# Patient Record
Sex: MALE | Race: WHITE
[De-identification: names, ages, dates, MRNs, and addresses within clinical notes are randomized per-mention and may not be internally consistent; named-entity substitution may affect disease eponyms.]

---

## 2019-09-25 ENCOUNTER — HOSPITAL ENCOUNTER (EMERGENCY)
Dept: HOSPITAL 61 - ER | Age: 84
Discharge: HOME | End: 2019-09-25
Payer: MEDICARE

## 2019-09-25 VITALS — BODY MASS INDEX: 27.92 KG/M2 | WEIGHT: 195 LBS | HEIGHT: 70 IN

## 2019-09-25 VITALS — SYSTOLIC BLOOD PRESSURE: 141 MMHG | DIASTOLIC BLOOD PRESSURE: 69 MMHG

## 2019-09-25 DIAGNOSIS — I48.91: ICD-10-CM

## 2019-09-25 DIAGNOSIS — I10: ICD-10-CM

## 2019-09-25 DIAGNOSIS — M10.9: Primary | ICD-10-CM

## 2019-09-25 DIAGNOSIS — E78.00: ICD-10-CM

## 2019-09-25 LAB
ANION GAP SERPL CALC-SCNC: 7 MMOL/L (ref 6–14)
BASOPHILS # BLD AUTO: 0 X10^3/UL (ref 0–0.2)
BASOPHILS NFR BLD: 0 % (ref 0–3)
BUN SERPL-MCNC: 27 MG/DL (ref 8–26)
CALCIUM SERPL-MCNC: 9 MG/DL (ref 8.5–10.1)
CHLORIDE SERPL-SCNC: 106 MMOL/L (ref 98–107)
CO2 SERPL-SCNC: 29 MMOL/L (ref 21–32)
CREAT SERPL-MCNC: 1.4 MG/DL (ref 0.7–1.3)
CRP SERPL-MCNC: < 0.5 MG/L (ref 0–3.3)
EOSINOPHIL NFR BLD: 0.1 X10^3/UL (ref 0–0.7)
EOSINOPHIL NFR BLD: 2 % (ref 0–3)
ERYTHROCYTE [DISTWIDTH] IN BLOOD BY AUTOMATED COUNT: 13.9 % (ref 11.5–14.5)
GFR SERPLBLD BASED ON 1.73 SQ M-ARVRAT: 48.4 ML/MIN
GLUCOSE SERPL-MCNC: 97 MG/DL (ref 70–99)
HCT VFR BLD CALC: 39.9 % (ref 39–53)
HGB BLD-MCNC: 13.8 G/DL (ref 13–17.5)
LYMPHOCYTES # BLD: 1.1 X10^3/UL (ref 1–4.8)
LYMPHOCYTES NFR BLD AUTO: 16 % (ref 24–48)
MCH RBC QN AUTO: 32 PG (ref 25–35)
MCHC RBC AUTO-ENTMCNC: 35 G/DL (ref 31–37)
MCV RBC AUTO: 91 FL (ref 79–100)
MONO #: 0.6 X10^3/UL (ref 0–1.1)
MONOCYTES NFR BLD: 9 % (ref 0–9)
NEUT #: 5.3 X10^3/UL (ref 1.8–7.7)
NEUTROPHILS NFR BLD AUTO: 74 % (ref 31–73)
PLATELET # BLD AUTO: 163 X10^3/UL (ref 140–400)
POTASSIUM SERPL-SCNC: 4.3 MMOL/L (ref 3.5–5.1)
RBC # BLD AUTO: 4.39 X10^6/UL (ref 4.3–5.7)
SODIUM SERPL-SCNC: 142 MMOL/L (ref 136–145)
WBC # BLD AUTO: 7.2 X10^3/UL (ref 4–11)

## 2019-09-25 PROCEDURE — 84550 ASSAY OF BLOOD/URIC ACID: CPT

## 2019-09-25 PROCEDURE — 85025 COMPLETE CBC W/AUTO DIFF WBC: CPT

## 2019-09-25 PROCEDURE — 85651 RBC SED RATE NONAUTOMATED: CPT

## 2019-09-25 PROCEDURE — 80048 BASIC METABOLIC PNL TOTAL CA: CPT

## 2019-09-25 PROCEDURE — 86140 C-REACTIVE PROTEIN: CPT

## 2019-09-25 PROCEDURE — 73630 X-RAY EXAM OF FOOT: CPT

## 2019-09-25 PROCEDURE — 36415 COLL VENOUS BLD VENIPUNCTURE: CPT

## 2019-09-25 NOTE — RAD
Examination: FOOT LEFT 3V

 

History: Pain

 

Comparison/Correlation: None

 

Findings: Total of 3 images of the left foot were obtained. Joint spaces 

are unremarkable.

 

 

Small calcaneal spurs present. Enthesopathy at the distal Achilles tendon 

attachment site noted.

 

There is a linear lucency involving the fifth digit proximal phalanx at 

its distal metaphyseal aspect extending nearly to the epiphysis. This is 

seen only on the AP view.

 

 

Impression:

Linear lucency involving the fifth digit proximal phalanx. Correlate with 

symptoms for possibly of a nondisplaced fracture.

 

First metatarsophalangeal joint is unremarkable for the patient's age.

 

Electronically signed by: Calin Sosa MD (9/25/2019 8:26 AM) [facility]

## 2019-09-25 NOTE — PHYS DOC
Past Medical History


Past Medical History:  A-Fib, High Cholesterol, Hypertension


Smoking:  Cigarettes (The patient is a nonsmoker.)





Adult General


Chief Complaint


Chief Complaint:  LOWER EXT PAIN





HPI


HPI


Patient is a pleasant 84-year-old female presents to the emergency department 

for evaluation. He states last night, he developed atraumatic left foot pain, 

located in his first MTP joint. He denies any fevers, or any injuries. 

Ambulation significantly worsens his pain, as does palpation. There are no 

alleviating factors to her symptoms. He has no previously known history of gout 

that he is aware of. He does have a past history of atrial fibrillation and 

takes Eliquis. He denies any chest pain, or shortness of breath, or any other 

concerning symptoms.





Review of Systems


Review of Systems





Constitutional: Denies fever or chills []


Eyes: Denies change in visual acuity, redness, or eye pain []


HENT: Denies nasal congestion or sore throat []


Respiratory: Denies cough or shortness of breath []


Cardiovascular: The patient denies any shortness of breath, chest pain, 

palpitations, or orthopnea[]


GI: Denies abdominal pain, nausea, vomiting, bloody stools or diarrhea []


: Denies dysuria or hematuria []


Musculoskeletal: Denies back pain or joint pain except as noted in the history 

of present illness []


Integument: Denies rash or skin lesions []


Neurologic: Denies headache, focal weakness or sensory changes []


Endocrine: Denies polyuria or polydipsia []





All other systems were reviewed and found to be within normal limits, except as 

documented in this note.





Current Medications


Current Medications





Current Medications








 Medications


  (Trade)  Dose


 Ordered  Sig/Burak  Start Time


 Stop Time Status Last Admin


Dose Admin


 


 Colchicine


  (Colcrys)  1.2 mg  1X  ONCE  9/25/19 08:45


 9/25/19 08:46 DC 9/25/19 08:59


1.2 MG


 


 Indomethacin


  (Indocin)  25 mg  1X  9/25/19 08:45


 9/25/19 09:56 DC 9/25/19 08:59


25 MG











Allergies


Allergies





Allergies








Coded Allergies Type Severity Reaction Last Updated Verified


 


  No Known Drug Allergies    9/25/19 No











Physical Exam


Physical Exam


PHYSICAL EXAM:





CONSTITUTIONAL: Well developed, well nourished


HEAD: normocephalic, atraumatic


EENT: PERRL, EOMI. Conjunctivae normal color, sclerae non-icteric; moist mucous 

membranes.


NECK: Supple, non-tender; no meningismus.


LUNGS: Lungs CTA, breathing even and unlabored. Normal air movement.


HEART: Regular rate and rhythm, no murmur


CHEST: No deformity; non-tender


ABDOMEN: The abdomen is soft, and non-tender, no masses or bruits.


EXTREM: Normal ROM; no deformity, no calf tenderness. Normal pulses palpable in 

all extremities. There is no pedal edema. There is mild erythema, and tenderness

 to palpation in the first MTP joint of the left foot, without significant 

warmth. The remainder of the foot is unremarkable and nontender, strong dorsalis

 pedis pulses present, the left ankle and remainder the left lower extremity are

 unremarkable.


SKIN: No rash; no diaphoresis


NEURO: Alert; normal speech and cognition; CN's grossly intact; strength grossly

 intact without focal deficit.


BACK: No CVA TTP.





Current Patient Data


Vital Signs





                                   Vital Signs








  Date Time  Temp Pulse Resp B/P (MAP) Pulse Ox O2 Delivery O2 Flow Rate FiO2


 


9/25/19 08:00 97.7 71 17 148/68 (94) 97 Room Air  





 97.7       








Lab Values





                                Laboratory Tests








Test


 9/25/19


08:35


 


White Blood Count


 7.2 x10^3/uL


(4.0-11.0)


 


Red Blood Count


 4.39 x10^6/uL


(4.30-5.70)


 


Hemoglobin


 13.8 g/dL


(13.0-17.5)


 


Hematocrit


 39.9 %


(39.0-53.0)


 


Mean Corpuscular Volume


 91 fL ()





 


Mean Corpuscular Hemoglobin 32 pg (25-35)  


 


Mean Corpuscular Hemoglobin


Concent 35 g/dL


(31-37)


 


Red Cell Distribution Width


 13.9 %


(11.5-14.5)


 


Platelet Count


 163 x10^3/uL


(140-400)


 


Neutrophils (%) (Auto) 74 % (31-73)  H


 


Lymphocytes (%) (Auto) 16 % (24-48)  L


 


Monocytes (%) (Auto) 9 % (0-9)  


 


Eosinophils (%) (Auto) 2 % (0-3)  


 


Basophils (%) (Auto) 0 % (0-3)  


 


Neutrophils # (Auto)


 5.3 x10^3/uL


(1.8-7.7)


 


Lymphocytes # (Auto)


 1.1 x10^3/uL


(1.0-4.8)


 


Monocytes # (Auto)


 0.6 x10^3/uL


(0.0-1.1)


 


Eosinophils # (Auto)


 0.1 x10^3/uL


(0.0-0.7)


 


Basophils # (Auto)


 0.0 x10^3/uL


(0.0-0.2)


 


Erythrocyte Sedimentation Rate 15 (0-15)  


 


Sodium Level


 142 mmol/L


(136-145)


 


Potassium Level


 4.3 mmol/L


(3.5-5.1)


 


Chloride Level


 106 mmol/L


()


 


Carbon Dioxide Level


 29 mmol/L


(21-32)


 


Anion Gap 7 (6-14)  


 


Blood Urea Nitrogen


 27 mg/dL


(8-26)  H


 


Creatinine


 1.4 mg/dL


(0.7-1.3)  H


 


Estimated GFR


(Cockcroft-Gault) 48.4  





 


Glucose Level


 97 mg/dL


(70-99)


 


Calcium Level


 9.0 mg/dL


(8.5-10.1)


 


C-Reactive Protein,


Quantitative < 0.5 mg/L


(0-3.3)





                                Laboratory Tests


9/25/19 08:35








                                Laboratory Tests


9/25/19 08:35











EKG


EKG


[]





Radiology/Procedures


Radiology/Procedures


[PROCEDURE: FOOT LEFT 3V





Examination: FOOT LEFT 3V


 


History: Pain


 


Comparison/Correlation: None


 


Findings: Total of 3 images of the left foot were obtained. Joint spaces 


are unremarkable.


 


 


Small calcaneal spurs present. Enthesopathy at the distal Achilles tendon 


attachment site noted.


 


There is a linear lucency involving the fifth digit proximal phalanx at 


its distal metaphyseal aspect extending nearly to the epiphysis. This is 


seen only on the AP view.


 


 


Impression:


Linear lucency involving the fifth digit proximal phalanx. Correlate with 


symptoms for possibly of a nondisplaced fracture.


 


First metatarsophalangeal joint is unremarkable for the patient's age.]





Course & Med Decision Making


Course & Med Decision Making


Pertinent Labs and Imaging studies reviewed. (See chart for details)





[]9:30 AM: Patient's condition remains stable. There is no tenderness to 

palpation at the fifth digit, to correlate with finding on x-ray, I suspect that

 this is a skin fold shadow appearing on the x-ray. Uric acid level is currently

 pending. Clinical presentation is consistent with gout. Rest home care with the

 patient, the need for close PCP follow-up, and return precautions. Patient 

reports better pain tolerance and ambulation tolerance after administered 

medications.





Dragon Disclaimer


Dragon Disclaimer


This electronic medical record was generated, in whole or in part, using a voice

 recognition dictation system.





Departure


Departure


Impression:  


   Primary Impression:  


   Podagra


Disposition:  01 HOME, SELF-CARE


Condition:  STABLE


Referrals:  


UNKNOWN PCP NAME (PCP)


Patient Instructions:  Gout, Easy-to-Read


Scripts


Indomethacin (INDOMETHACIN) 25 Mg Capsule


1 CAP PO TID PRN for PAIN, #20 CAP


   Prov: KASHIF FELIX MD         9/25/19 


Colchicine (COLCRYS) 0.6 Mg Tablet


1 TAB PO BID PRN for PAIN, #30 TAB 0 Refills


   Prov: KASHIF FELIX MD         9/25/19











KASHIF FELIX MD           Sep 25, 2019 08:17